# Patient Record
Sex: FEMALE | Race: BLACK OR AFRICAN AMERICAN | NOT HISPANIC OR LATINO | Employment: FULL TIME | URBAN - METROPOLITAN AREA
[De-identification: names, ages, dates, MRNs, and addresses within clinical notes are randomized per-mention and may not be internally consistent; named-entity substitution may affect disease eponyms.]

---

## 2019-06-24 PROBLEM — I10 HYPERTENSION: Status: ACTIVE | Noted: 2018-02-27

## 2019-06-24 PROBLEM — I89.0 LYMPHEDEMA: Status: ACTIVE | Noted: 2018-08-31

## 2019-06-24 PROBLEM — Z87.59 H/O SEVERE PRE-ECLAMPSIA: Status: ACTIVE | Noted: 2018-10-10

## 2019-06-24 PROBLEM — E66.01 MORBID OBESITY DUE TO EXCESS CALORIES: Status: ACTIVE | Noted: 2017-04-12

## 2021-05-21 PROBLEM — M54.50 ACUTE LOW BACK PAIN: Status: ACTIVE | Noted: 2021-05-21

## 2021-05-21 PROBLEM — R79.89 ELEVATED D-DIMER: Status: ACTIVE | Noted: 2021-05-21

## 2021-05-21 PROBLEM — A41.9 SEVERE SEPSIS: Status: ACTIVE | Noted: 2021-05-21

## 2021-05-21 PROBLEM — R65.20 SEVERE SEPSIS: Status: ACTIVE | Noted: 2021-05-21

## 2021-05-21 PROBLEM — R04.2 HEMOPTYSIS: Status: ACTIVE | Noted: 2021-05-21

## 2021-05-21 PROBLEM — J18.9 RIGHT LOWER LOBE PNEUMONIA: Status: ACTIVE | Noted: 2021-05-21

## 2021-05-21 PROBLEM — R05.9 COUGH: Status: ACTIVE | Noted: 2021-05-21

## 2021-05-21 PROBLEM — R91.8 ABNORMAL CT SCAN OF LUNG: Status: ACTIVE | Noted: 2021-05-21

## 2021-05-21 PROBLEM — R07.9 CHEST PAIN: Status: ACTIVE | Noted: 2021-05-21

## 2021-05-23 PROBLEM — R51.9 PERSISTENT HEADACHES: Status: RESOLVED | Noted: 2021-05-23 | Resolved: 2021-05-23

## 2021-05-23 PROBLEM — R51.9 HEADACHE: Status: ACTIVE | Noted: 2021-05-23

## 2021-05-24 PROBLEM — R05.9 COUGH: Status: RESOLVED | Noted: 2021-05-21 | Resolved: 2021-05-24

## 2021-05-24 PROBLEM — S39.012A LUMBAR STRAIN: Status: ACTIVE | Noted: 2021-05-21

## 2021-05-24 PROBLEM — J96.01 ACUTE HYPOXEMIC RESPIRATORY FAILURE: Status: ACTIVE | Noted: 2021-05-24

## 2021-05-24 PROBLEM — R91.8 ABNORMAL CT SCAN OF LUNG: Status: RESOLVED | Noted: 2021-05-21 | Resolved: 2021-05-24

## 2021-06-10 PROBLEM — K21.9 GASTROESOPHAGEAL REFLUX DISEASE: Status: ACTIVE | Noted: 2021-06-10

## 2021-06-10 PROBLEM — R06.09 DYSPNEA ON EXERTION: Status: ACTIVE | Noted: 2021-06-10

## 2021-09-13 PROBLEM — Z87.59 H/O SEVERE PRE-ECLAMPSIA: Status: RESOLVED | Noted: 2018-10-10 | Resolved: 2021-09-13

## 2021-09-13 PROBLEM — R04.2 HEMOPTYSIS: Status: RESOLVED | Noted: 2021-05-21 | Resolved: 2021-09-13

## 2021-09-13 PROBLEM — E66.01 MORBID OBESITY WITH BODY MASS INDEX OF 60.0-69.9 IN ADULT: Chronic | Status: ACTIVE | Noted: 2017-04-12

## 2021-09-13 PROBLEM — I10 HYPERTENSION: Chronic | Status: ACTIVE | Noted: 2018-02-27

## 2021-09-13 PROBLEM — K21.9 GASTROESOPHAGEAL REFLUX DISEASE: Chronic | Status: ACTIVE | Noted: 2021-06-10

## 2021-09-13 PROBLEM — J96.01 ACUTE HYPOXEMIC RESPIRATORY FAILURE: Status: RESOLVED | Noted: 2021-05-24 | Resolved: 2021-09-13

## 2021-12-03 PROBLEM — G62.9 NEUROPATHY: Status: ACTIVE | Noted: 2021-12-03

## 2021-12-29 ENCOUNTER — PATIENT MESSAGE (OUTPATIENT)
Dept: ADMINISTRATIVE | Facility: OTHER | Age: 36
End: 2021-12-29

## 2021-12-29 DIAGNOSIS — U07.1 COVID-19 VIRUS DETECTED: ICD-10-CM

## 2023-04-03 PROBLEM — G43.909 MIGRAINE: Status: ACTIVE | Noted: 2023-04-03

## 2023-04-03 PROBLEM — G56.03 BILATERAL CARPAL TUNNEL SYNDROME: Status: ACTIVE | Noted: 2023-04-03

## 2023-06-13 PROBLEM — G56.00 CARPAL TUNNEL SYNDROME: Status: ACTIVE | Noted: 2023-06-13

## 2023-07-07 PROBLEM — Z98.890 HISTORY OF CARPAL TUNNEL RELEASE: Status: ACTIVE | Noted: 2023-07-07

## 2023-07-11 PROBLEM — T81.40XA POST OP INFECTION: Status: ACTIVE | Noted: 2023-07-11

## 2023-07-21 PROBLEM — T81.41XA INFECTION OF SUPERFICIAL INCISIONAL SURGICAL SITE AFTER PROCEDURE: Status: ACTIVE | Noted: 2023-07-21

## 2023-08-03 PROBLEM — G47.33 OSA (OBSTRUCTIVE SLEEP APNEA): Status: ACTIVE | Noted: 2023-08-03

## 2023-12-12 PROBLEM — M76.42: Status: ACTIVE | Noted: 2023-12-12

## 2023-12-12 PROBLEM — M25.562 PATELLOFEMORAL ARTHRALGIA OF LEFT KNEE: Status: ACTIVE | Noted: 2023-12-12

## 2023-12-12 PROBLEM — E66.813 CLASS 3 SEVERE OBESITY WITH BODY MASS INDEX (BMI) OF 60.0 TO 69.9 IN ADULT: Status: ACTIVE | Noted: 2017-04-12

## 2023-12-12 PROBLEM — M17.12 OSTEOARTHRITIS OF LEFT KNEE: Status: ACTIVE | Noted: 2023-12-12

## 2023-12-16 PROBLEM — S80.01XA CONTUSION OF RIGHT KNEE: Status: ACTIVE | Noted: 2023-12-16

## 2024-08-08 PROBLEM — M47.9 SPONDYLOSIS: Status: ACTIVE | Noted: 2024-08-08

## 2024-08-08 PROBLEM — M47.817 FACET ARTHROPATHY, LUMBOSACRAL: Status: ACTIVE | Noted: 2024-08-08

## 2024-08-28 DIAGNOSIS — U07.1 COVID-19 VIRUS DETECTED: ICD-10-CM

## 2024-10-18 ENCOUNTER — TELEPHONE (OUTPATIENT)
Dept: ADMINISTRATIVE | Facility: HOSPITAL | Age: 39
End: 2024-10-18

## 2024-10-18 VITALS — SYSTOLIC BLOOD PRESSURE: 138 MMHG | DIASTOLIC BLOOD PRESSURE: 78 MMHG

## 2025-02-08 PROBLEM — J06.9 UPPER RESPIRATORY TRACT INFECTION: Status: ACTIVE | Noted: 2025-02-08

## 2025-06-27 ENCOUNTER — PATIENT OUTREACH (OUTPATIENT)
Dept: ADMINISTRATIVE | Facility: HOSPITAL | Age: 40
End: 2025-06-27

## 2025-06-27 DIAGNOSIS — E11.9 DIABETES MELLITUS WITHOUT COMPLICATION: Primary | ICD-10-CM

## 2025-06-27 NOTE — PROGRESS NOTES
6-27-25- please address open care gap DM eye exam and collect A1c  noted on 6-30-25 appt notes